# Patient Record
(demographics unavailable — no encounter records)

---

## 2025-01-17 NOTE — HISTORY OF PRESENT ILLNESS
[FreeTextEntry1] : Annual  [de-identified] : 54-year-old female presenting to [facility] care and for an annual physical exam.  Patient was being followed with Dr. Cruz-hali in the past.  Reports she was recently diagnosed with depression on September 2024 and is following with psychiatrist and therapist and is on medication. Patient cbc, amp done last month, reviewed.  PMH: Anemia, Depression (diagnosed September 2024).  Psychiatrist: Dr. Trish Rubi Therapist: Dr. Robles Courtney   PSH: None  Supplement: MVT, Mg, Iron

## 2025-01-17 NOTE — ASSESSMENT
[FreeTextEntry1] : 54-year-old female presented for an annual physical exam.  routine blood work today, blood collected in the office. up to date with screening. will call back with results.

## 2025-01-17 NOTE — HEALTH RISK ASSESSMENT
[No falls in past year] : Patient reported no falls in the past year [Feeling down, depressed, or hopeless] : 2) Feeling down, depressed, or hopeless [Never] : Never [Patient reported mammogram was normal] : Patient reported mammogram was normal [Patient reported PAP Smear was normal] : Patient reported PAP Smear was normal [Patient reported colonoscopy was normal] : Patient reported colonoscopy was normal [No] : In the past 12 months have you used drugs other than those required for medical reasons? No [2] : 2) Feeling down, depressed, or hopeless for more than half of the days (2) [PHQ-2 Positive] : PHQ-2 Positive [I have developed a follow-up plan documented below in the note.] : I have developed a follow-up plan documented below in the note. [HIV test declined] : HIV test declined [Hepatitis C test declined] : Hepatitis C test declined [None] : None [With Family] : lives with family [# of Members in Household ___] :  household currently consist of [unfilled] member(s) [Unemployed] : unemployed [] :  [# Of Children ___] : has [unfilled] children [Fully functional (bathing, dressing, toileting, transferring, walking, feeding)] : Fully functional (bathing, dressing, toileting, transferring, walking, feeding) [Fully functional (using the telephone, shopping, preparing meals, housekeeping, doing laundry, using] : Fully functional and needs no help or supervision to perform IADLs (using the telephone, shopping, preparing meals, housekeeping, doing laundry, using transportation, managing medications and managing finances) [Reports normal functional visual acuity (ie: able to read med bottle)] : Reports normal functional visual acuity [de-identified] : Yoga  [de-identified] : it's ok  [[id number]] : 4 [Change in mental status noted] : No change in mental status noted [Reports changes in hearing] : Reports no changes in hearing [Reports changes in vision] : Reports no changes in vision [Reports changes in dental health] : Reports no changes in dental health [MammogramDate] : 10/24 [PapSmearDate] : 10/24 [ColonoscopyDate] : 01/21 [ColonoscopyComments] : normal

## 2025-02-05 NOTE — HEALTH RISK ASSESSMENT
[No] : No [No falls in past year] : Patient reported no falls in the past year [0] : 2) Feeling down, depressed, or hopeless: Not at all (0) [PHQ-2 Negative - No further assessment needed] : PHQ-2 Negative - No further assessment needed [Never] : Never [Audit-CScore] : 0 [[id number]] : 0

## 2025-02-05 NOTE — PHYSICAL EXAM
[Normal] : the outer ears and nose were normal in appearance and the oropharynx was normal [de-identified] : Bilateral impacted cerumen

## 2025-02-05 NOTE — HISTORY OF PRESENT ILLNESS
[FreeTextEntry1] : ear irritation [de-identified] : 54-year-old female with bilateral impacted cerumen presenting to have an irrigation.  Patient reports she has used Debrox in her years.

## 2025-02-25 NOTE — HISTORY OF PRESENT ILLNESS
[de-identified] : loose bowel movement with every meal.  [id number] last received -end of december, end of January 2025 feeling of cold throughout the winter  history of anemia supplement: magnesium, mvt, not consistent with vitamin d   no appetite to eat:  cereal/nuts/barries/milk - breakfast. lunch: not eating. eggs/beans. dinner: doesn't feel like eating.  taking ensure 1-2/day.

## 2025-02-25 NOTE — HISTORY OF PRESENT ILLNESS
[de-identified] : loose bowel movement with every meal.  [id number] last received -end of december, end of January 2025 feeling of cold throughout the winter  history of anemia supplement: magnesium, mvt, not consistent with vitamin d   no appetite to eat:  cereal/nuts/barries/milk - breakfast. lunch: not eating. eggs/beans. dinner: doesn't feel like eating.  taking ensure 1-2/day.

## 2025-02-25 NOTE — HEALTH RISK ASSESSMENT
[No] : No [No falls in past year] : Patient reported no falls in the past year [3] : 2) Feeling down, depressed, or hopeless for nearly every day (3) [PHQ-2 Positive] : PHQ-2 Positive [Never] : Never [Audit-CScore] : 0 [[id number]] : 6

## 2025-02-25 NOTE — HISTORY OF PRESENT ILLNESS
[de-identified] : loose bowel movement with every meal.  [id number] last received -end of december, end of January 2025 feeling of cold throughout the winter  history of anemia supplement: magnesium, mvt, not consistent with vitamin d   no appetite to eat:  cereal/nuts/barries/milk - breakfast. lunch: not eating. eggs/beans. dinner: doesn't feel like eating.  taking ensure 1-2/day.

## 2025-02-25 NOTE — HEALTH RISK ASSESSMENT
[No] : No [No falls in past year] : Patient reported no falls in the past year [3] : 2) Feeling down, depressed, or hopeless for nearly every day (3) [PHQ-2 Positive] : PHQ-2 Positive [Never] : Never [Audit-CScore] : 0 [[id number]] : 6

## 2025-02-25 NOTE — HEALTH RISK ASSESSMENT
[No] : No [No falls in past year] : Patient reported no falls in the past year [3] : 2) Feeling down, depressed, or hopeless for nearly every day (3) [PHQ-2 Positive] : PHQ-2 Positive [Never] : Never [Audit-CScore] : 0 [[id number]] : 6

## 2025-04-22 NOTE — ASSESSMENT
[FreeTextEntry1] : 55-year-old female with a past medical history of anemia, PTSD, depression and Insomnia presenting for a follow-up. Patient is continuing transmagentic stimulation and following with psychiatrist and therapist with changes in her medication from lexpapro to Pristiq. Plan: Will recheck labs today including CMP and work up for siadh - blood collected in the office. Referred patient to endocrinologist - cold intolerance.  Will call back with results.

## 2025-04-22 NOTE — HEALTH RISK ASSESSMENT
[No] : No [No falls in past year] : Patient reported no falls in the past year [3] : 2) Feeling down, depressed, or hopeless for nearly every day (3) [PHQ-2 Negative - No further assessment needed] : PHQ-2 Negative - No further assessment needed [Never] : Never [Audit-CScore] : 0 [[id number]] : 6

## 2025-04-22 NOTE — HISTORY OF PRESENT ILLNESS
[FreeTextEntry1] : Follow up  [de-identified] : 55-year-old female with a past medical history of anemia, PTSD, depression and Insomnia presenting for a follow-up. Patient was seen 1 month ago with hyponatremia, weight loss, and hypotension with questionable SIADH due to Lexapro. She presents today to have lab repeated. Lexapro has since been discontinued and she is on Pristiq 25mg, Seroquel 37.5mg and clonazepam 0.5mg bid now. She is undergoing transmagentic stimulation at this time but reports not much changes in her mood or appetite. She reports to feeling cold most of the time despite trying to warm up.  Psychiatrist: Dr. Trish Rubi - follows weekly. Therapist: Dr. Robles Courtney -Follows weekly.